# Patient Record
Sex: FEMALE | Race: WHITE
[De-identification: names, ages, dates, MRNs, and addresses within clinical notes are randomized per-mention and may not be internally consistent; named-entity substitution may affect disease eponyms.]

---

## 2017-10-09 ENCOUNTER — HOSPITAL ENCOUNTER (EMERGENCY)
Dept: HOSPITAL 47 - EC | Age: 37
Discharge: HOME | End: 2017-10-09
Payer: MEDICAID

## 2017-10-09 VITALS
TEMPERATURE: 98.1 F | SYSTOLIC BLOOD PRESSURE: 122 MMHG | DIASTOLIC BLOOD PRESSURE: 63 MMHG | HEART RATE: 64 BPM | RESPIRATION RATE: 18 BRPM

## 2017-10-09 DIAGNOSIS — F17.200: ICD-10-CM

## 2017-10-09 DIAGNOSIS — Z79.899: ICD-10-CM

## 2017-10-09 DIAGNOSIS — S82.831A: Primary | ICD-10-CM

## 2017-10-09 DIAGNOSIS — F32.9: ICD-10-CM

## 2017-10-09 DIAGNOSIS — F41.9: ICD-10-CM
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
## 2017-10-09 PROCEDURE — 99284 EMERGENCY DEPT VISIT MOD MDM: CPT

## 2017-10-09 PROCEDURE — 29515 APPLICATION SHORT LEG SPLINT: CPT

## 2017-10-09 NOTE — XR
EXAMINATION TYPE: XR ankle complete RT

 

DATE OF EXAM: 10/9/2017

 

COMPARISON: NONE

 

HISTORY: 37-year-old female fall downstairs, lateral sided pain

 

TECHNIQUE: 3 views

 

FINDINGS: 

There is a minimally displaced oblique fracture of the distal fibula at the level of the ankle joint 
line. There is borderline widening of the medial clear space at 4.1 mm. Talar dome appears intact. Po
sterior malleolus appears intact. Subtalar joint is aligned. Tiny plantar and posterior calcaneal spu
rs. Underlying ankle joint effusion noted.

 

 

IMPRESSION: 

1. Minimally displaced oblique fracture of the lateral malleolus.

2. Borderline widening of the medial clear space could reflect underlying deltoid ligament injury. Co
nsider a medial stress view to evaluate the integrity of the deltoid ligament.

## 2017-10-09 NOTE — ED
General Adult HPI





- General


Chief complaint: Fall


Stated complaint: fall/rt leg injury


Time Seen by Provider: 10/09/17 10:19


Source: patient, RN notes reviewed


Mode of arrival: wheelchair


Limitations: no limitations





- History of Present Illness


Initial comments: 





Patient is a 37-year-old female who presents emergency room today with chief 

complaint of an injury to the right ankle that occurred approximately 2 hours.  

Does not that she slipped going down some stairs.  Denies any other injury.  

Denies any head injury or loss consciousness.  Admits to pain locally to the 

right ankle with difficulty ambulating due to pain.  Patient denies any recent 

fever, chills, shortness of breath, chest pain, back pain, abdominal pain, 

nausea or vomiting, numbness or tingling, dysuria or hematuria, constipation or 

diarrhea, headaches or visual changes, or any other complaints.





- Related Data


 Home Medications











 Medication  Instructions  Recorded  Confirmed


 


ALPRAZolam [Xanax] 0.5 mg PO DAILY PRN 10/09/17 10/09/17


 


Cetirizine HCl [Zyrtec] 10 mg PO DAILY 10/09/17 10/09/17


 


Doxycycline Hyclate 100 mg PO BID 10/09/17 10/09/17


 


Escitalopram [Lexapro] 30 mg PO DAILY 10/09/17 10/09/17


 


Ibuprofen [Motrin] 800 mg PO ONCE PRN 10/09/17 10/09/17


 


buPROPion XL [Wellbutrin Xl] 150 mg PO DAILY 10/09/17 10/09/17








 Previous Rx's











 Medication  Instructions  Recorded


 


Acetaminophen-Codeine 300-30mg 1 each PO Q6H PRN #20 tablet 10/09/17





[Tylenol #3]  











 Allergies











Allergy/AdvReac Type Severity Reaction Status Date / Time


 


No Known Allergies Allergy   Verified 10/09/17 10:45














Review of Systems


ROS Statement: 


Those systems with pertinent positive or pertinent negative responses have been 

documented in the HPI.





ROS Other: All systems not noted in ROS Statement are negative.





Past Medical History


Past Medical History: No Reported History


History of Any Multi-Drug Resistant Organisms: None Reported


Past Surgical History: Appendectomy, Cholecystectomy


Past Psychological History: Anxiety, Depression


Smoking Status: Current some day smoker


Past Alcohol Use History: Rare


Past Drug Use History: None Reported





General Exam





- General Exam Comments


Initial Comments: 





General:  The patient is awake and alert, in no distress, and does not appear 

acutely ill.   


Neck:  The neck is supple, there is no tenderness or JVD.  


Cardiovascular:  There is a regular rate and rhythm. No murmur, rub or gallop 

is appreciated.


Respiratory:  Lungs are clear to auscultation, respirations are non-labored, 

breath sounds are equal.  No wheezes, stridor, rales, or rhonchi.


Musculoskeletal: Patient does have moderate swelling to the lateral aspect of 

the right ankle.  Tender over the lateral malleolus.  No tenderness over the 

medial.  Tender in ATFL.  Sensation intact.  Pulses equal bilaterally 2+.  No 

tenderness down to the right foot.  No tenderness to the fibular head or right 

knee.


Neurological:  A&O x 3. CN II-XII intact, There are no obvious motor or sensory 

deficits. Coordination appears grossly intact. Speech is normal.


Skin:  Skin is warm and dry and no rashes or lesions are noted. 


Psychiatric:  Normal mood and affect.  


Limitations: no limitations





Course


 Vital Signs











  10/09/17





  10:12


 


Temperature 97.8 F


 


Pulse Rate 89


 


Respiratory 17





Rate 


 


Blood Pressure 135/65


 


O2 Sat by Pulse 96





Oximetry 














Medical Decision Making





- Medical Decision Making





Patient x-ray reviewed and does show fracture the distal fibula.  Case was 

discussed with attending physician Dr. Villasenor.  CT of the ankle was also 

obtained.  Patient has been splinted in a posterior short leg OCL splint.  

Neurovascular rechecked and intact.  Patient will be given prescription for 

crutches and advised nonweightbearing on pain medication advised follow-up with 

pediatrician over the next 2 days.





Disposition


Clinical Impression: 


 Ankle fracture





Disposition: HOME SELF-CARE


Condition: Good


Instructions:  Ankle Fracture (ED)


Additional Instructions: 


Please use medication as discussed.  Continue to ice elevate and follow-up 

orthopedics over the next 2 days.  Please see splinted in place until follow-up 

appointment.  Please use crutches with nonweightbearing.  Please return to 

emergency room if the symptoms increase or worsen or for any other concerns.


Prescriptions: 


Acetaminophen-Codeine 300-30mg [Tylenol #3] 1 each PO Q6H PRN #20 tablet


 PRN Reason: Pain


Referrals: 


Derek White DO [Primary Care Provider] - 1-2 days


Bjorn Nobles MD [STAFF PHYSICIAN] - 1-2 days


Time of Disposition: 12:12

## 2017-10-09 NOTE — CT
CT right ankle

 

history: Fracture

 

Helical acquisition through the right ankle

 

Comparison to plain film same date

 

Oblique distal metaphyseal right fibular fracture is again noted, there is no significant displacemen
t. Minimal comminuted fragments are present distally at the medial extent. There is no evident disloc
ation. There is associated soft tissue swelling present. Ankle joint is intact. Achilles tendon is li
chaim intact. Flexor and extensor tendons felt to be intact.

 

IMPRESSION: Fracture as described and plain film. Additional findings above.

## 2018-05-07 ENCOUNTER — HOSPITAL ENCOUNTER (OUTPATIENT)
Dept: HOSPITAL 47 - LABWHC1 | Age: 38
Discharge: HOME | End: 2018-05-07
Payer: MEDICAID

## 2018-05-07 DIAGNOSIS — E55.9: Primary | ICD-10-CM

## 2018-05-07 PROCEDURE — 36415 COLL VENOUS BLD VENIPUNCTURE: CPT

## 2018-05-07 PROCEDURE — 82306 VITAMIN D 25 HYDROXY: CPT

## 2019-10-04 ENCOUNTER — HOSPITAL ENCOUNTER (OUTPATIENT)
Dept: HOSPITAL 47 - LABWHC1 | Age: 39
Discharge: HOME | End: 2019-10-04
Attending: PHYSICIAN ASSISTANT
Payer: MEDICAID

## 2019-10-04 DIAGNOSIS — L70.0: Primary | ICD-10-CM

## 2019-10-04 LAB
ALBUMIN SERPL-MCNC: 4.2 G/DL (ref 3.8–4.9)
ALBUMIN/GLOB SERPL: 2.21 G/DL (ref 1.6–3.17)
ALP SERPL-CCNC: 38 U/L (ref 41–126)
ALT SERPL-CCNC: 17 U/L (ref 8–44)
ANION GAP SERPL CALC-SCNC: 9.6 MMOL/L (ref 4–12)
AST SERPL-CCNC: 20 U/L (ref 13–35)
BUN SERPL-SCNC: 18 MG/DL (ref 9–27)
BUN/CREAT SERPL: 20 RATIO (ref 12–20)
CALCIUM SPEC-MCNC: 9.6 MG/DL (ref 8.7–10.3)
CHLORIDE SERPL-SCNC: 105 MMOL/L (ref 96–109)
CO2 SERPL-SCNC: 24.4 MMOL/L (ref 21.6–31.8)
GLOBULIN SER CALC-MCNC: 1.9 G/DL (ref 1.6–3.3)
GLUCOSE SERPL-MCNC: 113 MG/DL (ref 70–110)
POTASSIUM SERPL-SCNC: 4.1 MMOL/L (ref 3.5–5.5)
PROT SERPL-MCNC: 6.1 G/DL (ref 6.2–8.2)
SODIUM SERPL-SCNC: 139 MMOL/L (ref 135–145)

## 2019-10-04 PROCEDURE — 80053 COMPREHEN METABOLIC PANEL: CPT

## 2019-10-04 PROCEDURE — 36415 COLL VENOUS BLD VENIPUNCTURE: CPT

## 2021-04-16 ENCOUNTER — HOSPITAL ENCOUNTER (OUTPATIENT)
Dept: HOSPITAL 47 - LABWHC1 | Age: 41
Discharge: HOME | End: 2021-04-16
Attending: FAMILY MEDICINE
Payer: MEDICAID

## 2021-04-16 DIAGNOSIS — Z03.818: Primary | ICD-10-CM

## 2021-04-16 DIAGNOSIS — Z20.822: ICD-10-CM

## 2021-04-30 ENCOUNTER — HOSPITAL ENCOUNTER (OUTPATIENT)
Dept: HOSPITAL 47 - LABWHC1 | Age: 41
Discharge: HOME | End: 2021-04-30
Attending: FAMILY MEDICINE
Payer: MEDICAID

## 2021-04-30 DIAGNOSIS — R53.81: ICD-10-CM

## 2021-04-30 DIAGNOSIS — B34.1: Primary | ICD-10-CM

## 2021-04-30 PROCEDURE — 85025 COMPLETE CBC W/AUTO DIFF WBC: CPT

## 2021-04-30 PROCEDURE — 36415 COLL VENOUS BLD VENIPUNCTURE: CPT

## 2021-04-30 PROCEDURE — 80053 COMPREHEN METABOLIC PANEL: CPT

## 2021-04-30 PROCEDURE — 84439 ASSAY OF FREE THYROXINE: CPT

## 2021-04-30 PROCEDURE — 84443 ASSAY THYROID STIM HORMONE: CPT

## 2021-04-30 PROCEDURE — 82306 VITAMIN D 25 HYDROXY: CPT

## 2021-05-01 LAB
ALBUMIN SERPL-MCNC: 4.3 G/DL (ref 3.8–4.9)
ALBUMIN/GLOB SERPL: 1.72 G/DL (ref 1.6–3.17)
ALP SERPL-CCNC: 42 U/L (ref 41–126)
ALT SERPL-CCNC: 23 U/L (ref 8–44)
ANION GAP SERPL CALC-SCNC: 9.4 MMOL/L (ref 4–12)
AST SERPL-CCNC: 19 U/L (ref 13–35)
BASOPHILS # BLD AUTO: 0.05 X 10*3/UL (ref 0–0.1)
BASOPHILS NFR BLD AUTO: 0.5 %
BUN SERPL-SCNC: 14 MG/DL (ref 9–27)
BUN/CREAT SERPL: 15.56 RATIO (ref 12–20)
CALCIUM SPEC-MCNC: 9 MG/DL (ref 8.7–10.3)
CHLORIDE SERPL-SCNC: 107 MMOL/L (ref 96–109)
CO2 SERPL-SCNC: 24.6 MMOL/L (ref 21.6–31.8)
EOSINOPHIL # BLD AUTO: 0.25 X 10*3/UL (ref 0.04–0.35)
EOSINOPHIL NFR BLD AUTO: 2.7 %
ERYTHROCYTE [DISTWIDTH] IN BLOOD BY AUTOMATED COUNT: 4.75 X 10*6/UL (ref 4.1–5.2)
ERYTHROCYTE [DISTWIDTH] IN BLOOD: 11.9 % (ref 11.5–14.5)
GLOBULIN SER CALC-MCNC: 2.5 G/DL (ref 1.6–3.3)
GLUCOSE SERPL-MCNC: 110 MG/DL (ref 70–110)
HCT VFR BLD AUTO: 45.3 % (ref 37.2–46.3)
HGB BLD-MCNC: 15.1 G/DL (ref 12–15)
LYMPHOCYTES # SPEC AUTO: 2.55 X 10*3/UL (ref 0.9–5)
LYMPHOCYTES NFR SPEC AUTO: 27.2 %
MCH RBC QN AUTO: 31.8 PG (ref 27–32)
MCHC RBC AUTO-ENTMCNC: 33.3 G/DL (ref 32–37)
MCV RBC AUTO: 95.4 FL (ref 80–97)
MONOCYTES # BLD AUTO: 0.6 X 10*3/UL (ref 0.2–1)
MONOCYTES NFR BLD AUTO: 6.4 %
NEUTROPHILS # BLD AUTO: 5.91 X 10*3/UL (ref 1.8–7.7)
NEUTROPHILS NFR BLD AUTO: 63 %
PLATELET # BLD AUTO: 264 X 10*3/UL (ref 140–440)
POTASSIUM SERPL-SCNC: 4.1 MMOL/L (ref 3.5–5.5)
PROT SERPL-MCNC: 6.8 G/DL (ref 6.2–8.2)
SODIUM SERPL-SCNC: 141 MMOL/L (ref 135–145)
T4 FREE SERPL-MCNC: 1.1 NG/DL (ref 0.8–1.8)
WBC # BLD AUTO: 9.38 X 10*3/UL (ref 4.5–10)

## 2022-04-25 ENCOUNTER — HOSPITAL ENCOUNTER (OUTPATIENT)
Dept: HOSPITAL 47 - RADXRMAIN | Age: 42
Discharge: HOME | End: 2022-04-25
Attending: FAMILY MEDICINE
Payer: MEDICAID

## 2022-04-25 DIAGNOSIS — M79.642: Primary | ICD-10-CM

## 2022-04-25 NOTE — XR
EXAMINATION TYPE: XR hand complete LT

 

DATE OF EXAM: 4/25/2022

 

CLINICAL HISTORY: Left hand pain worse and third PIP joint.

 

TECHNIQUE:  Frontal, lateral and oblique images of the left hand are obtained.

 

COMPARISON: None.

 

FINDINGS:  There is no acute fracture/dislocation evident in the left hand. Mild to moderate narrowin
g throughout the PIP and DIP joints of the phalanges also at level of the fifth MCP joint.  No signif
icant spurring. The overlying soft tissue appears unremarkable.

 

IMPRESSION: As above.

## 2022-04-29 ENCOUNTER — HOSPITAL ENCOUNTER (OUTPATIENT)
Dept: HOSPITAL 47 - LABWHC1 | Age: 42
Discharge: HOME | End: 2022-04-29
Attending: FAMILY MEDICINE
Payer: MEDICAID

## 2022-04-29 DIAGNOSIS — Z00.00: Primary | ICD-10-CM

## 2022-04-29 LAB
ALBUMIN SERPL-MCNC: 4.2 G/DL (ref 3.8–4.9)
ALBUMIN/GLOB SERPL: 1.75 G/DL (ref 1.6–3.17)
ALP SERPL-CCNC: 50 U/L (ref 41–126)
ALT SERPL-CCNC: 14 U/L (ref 8–44)
ANION GAP SERPL CALC-SCNC: 13.9 MMOL/L (ref 10–18)
AST SERPL-CCNC: 10 U/L (ref 13–35)
BASOPHILS # BLD AUTO: 0.06 X 10*3/UL (ref 0–0.1)
BASOPHILS NFR BLD AUTO: 0.4 %
BUN SERPL-SCNC: 18.8 MG/DL (ref 9–27)
BUN/CREAT SERPL: 23.98 RATIO (ref 12–20)
CALCIUM SPEC-MCNC: 9.5 MG/DL (ref 8.7–10.3)
CHLORIDE SERPL-SCNC: 98 MMOL/L (ref 96–109)
CO2 SERPL-SCNC: 27.7 MMOL/L (ref 20–27.5)
EOSINOPHIL # BLD AUTO: 0.04 X 10*3/UL (ref 0.04–0.35)
EOSINOPHIL NFR BLD AUTO: 0.3 %
ERYTHROCYTE [DISTWIDTH] IN BLOOD BY AUTOMATED COUNT: 5.02 X 10*6/UL (ref 4.1–5.2)
ERYTHROCYTE [DISTWIDTH] IN BLOOD: 12.7 % (ref 11.5–14.5)
GLOBULIN SER CALC-MCNC: 2.4 G/DL (ref 1.6–3.3)
GLUCOSE SERPL-MCNC: 107 MG/DL (ref 70–110)
HCT VFR BLD AUTO: 48.7 % (ref 37.2–46.3)
HGB BLD-MCNC: 15.5 G/DL (ref 12–15)
IMM GRANULOCYTES BLD QL AUTO: 1 %
LYMPHOCYTES # SPEC AUTO: 2.03 X 10*3/UL (ref 0.9–5)
LYMPHOCYTES NFR SPEC AUTO: 13.3 %
MCH RBC QN AUTO: 30.9 PG (ref 27–32)
MCHC RBC AUTO-ENTMCNC: 31.8 G/DL (ref 32–37)
MCV RBC AUTO: 97 FL (ref 80–97)
MONOCYTES # BLD AUTO: 0.37 X 10*3/UL (ref 0.2–1)
MONOCYTES NFR BLD AUTO: 2.4 %
NEUTROPHILS # BLD AUTO: 12.64 X 10*3/UL (ref 1.8–7.7)
NEUTROPHILS NFR BLD AUTO: 82.6 %
NRBC BLD AUTO-RTO: 0 /100 WBCS (ref 0–0)
PLATELET # BLD AUTO: 297 X 10*3/UL (ref 140–440)
POTASSIUM SERPL-SCNC: 4.1 MMOL/L (ref 3.5–5.5)
PROT SERPL-MCNC: 6.5 G/DL (ref 6.2–8.2)
RHEUMATOID FACT SERPL-ACNC: <10 IU/ML (ref 0–15)
SODIUM SERPL-SCNC: 140 MMOL/L (ref 135–145)
T4 FREE SERPL-MCNC: 1.13 NG/DL (ref 0.8–1.8)
URATE SERPL-MCNC: 4.6 MG/DL (ref 2.9–7.7)
WBC # BLD AUTO: 15.29 X 10*3/UL (ref 4.5–10)

## 2022-04-29 PROCEDURE — 84439 ASSAY OF FREE THYROXINE: CPT

## 2022-04-29 PROCEDURE — 86038 ANTINUCLEAR ANTIBODIES: CPT

## 2022-04-29 PROCEDURE — 86140 C-REACTIVE PROTEIN: CPT

## 2022-04-29 PROCEDURE — 86200 CCP ANTIBODY: CPT

## 2022-04-29 PROCEDURE — 84443 ASSAY THYROID STIM HORMONE: CPT

## 2022-04-29 PROCEDURE — 84550 ASSAY OF BLOOD/URIC ACID: CPT

## 2022-04-29 PROCEDURE — 85652 RBC SED RATE AUTOMATED: CPT

## 2022-04-29 PROCEDURE — 86431 RHEUMATOID FACTOR QUANT: CPT

## 2022-04-29 PROCEDURE — 85025 COMPLETE CBC W/AUTO DIFF WBC: CPT

## 2022-04-29 PROCEDURE — 36415 COLL VENOUS BLD VENIPUNCTURE: CPT

## 2022-04-29 PROCEDURE — 80053 COMPREHEN METABOLIC PANEL: CPT

## 2022-04-30 LAB — CCP IGG SERPL-ACNC: <0.5 U/ML

## 2024-12-09 ENCOUNTER — HOSPITAL ENCOUNTER (EMERGENCY)
Dept: HOSPITAL 47 - EC | Age: 44
Discharge: HOME | End: 2024-12-09
Payer: MEDICAID

## 2024-12-09 VITALS
RESPIRATION RATE: 18 BRPM | SYSTOLIC BLOOD PRESSURE: 127 MMHG | TEMPERATURE: 98.8 F | DIASTOLIC BLOOD PRESSURE: 75 MMHG | HEART RATE: 72 BPM

## 2024-12-09 DIAGNOSIS — F17.200: ICD-10-CM

## 2024-12-09 DIAGNOSIS — M54.50: Primary | ICD-10-CM

## 2024-12-09 PROCEDURE — 99283 EMERGENCY DEPT VISIT LOW MDM: CPT

## 2024-12-09 PROCEDURE — 72110 X-RAY EXAM L-2 SPINE 4/>VWS: CPT

## 2024-12-09 PROCEDURE — 96372 THER/PROPH/DIAG INJ SC/IM: CPT

## 2024-12-09 RX ADMIN — KETOROLAC TROMETHAMINE STA MG: 15 INJECTION, SOLUTION INTRAMUSCULAR; INTRAVENOUS at 08:29

## 2024-12-09 NOTE — ED
General Adult HPI





- General


Chief complaint: Back Pain/Injury


Stated complaint: back pain


Time Seen by Provider: 12/09/24 07:54


Source: patient, RN notes reviewed, old records reviewed


Mode of arrival: ambulatory


Limitations: no limitations





- History of Present Illness


Initial comments: 





44-year-old female presenting with mid low back pain.  No specific injury.  

Patient states this has worsened over the past 24 hours.  She has had issue with

low back pain in the past.  Patient denies any hematuria or dysuria.  Denies 

flank pain.  Denies abdominal pain.  Denies any loss of bowel or bladder 

function.  No fever.





- Related Data


                                Home Medications











 Medication  Instructions  Recorded  Confirmed


 


ALPRAZolam [Xanax] 0.5 mg PO DAILY PRN 10/09/17 10/09/17


 


Cetirizine HCl [Zyrtec] 10 mg PO DAILY 10/09/17 10/09/17


 


Doxycycline Hyclate 100 mg PO BID 10/09/17 10/09/17


 


Escitalopram [Lexapro] 30 mg PO DAILY 10/09/17 10/09/17


 


Ibuprofen [Motrin] 800 mg PO ONCE PRN 10/09/17 10/09/17


 


buPROPion XL [Wellbutrin Xl] 150 mg PO DAILY 10/09/17 10/09/17








                                  Previous Rx's











 Medication  Instructions  Recorded


 


Acetaminophen-Codeine 300-30mg 1 each PO Q6H PRN #20 tablet 10/09/17





[Tylenol #3]  


 


Cyclobenzaprine [Flexeril] 10 mg PO TID PRN #9 tab 12/09/24


 


Ibuprofen [Motrin] 600 mg PO Q8HR PRN #24 tab 12/09/24











                                    Allergies











Allergy/AdvReac Type Severity Reaction Status Date / Time


 


No Known Allergies Allergy   Verified 12/09/24 07:54














Review of Systems


ROS Statement: 


Those systems with pertinent positive or pertinent negative responses have been 

documented in the HPI.





ROS Other: All systems not noted in ROS Statement are negative.





Past Medical History


Past Medical History: No Reported History


History of Any Multi-Drug Resistant Organisms: None Reported


Past Surgical History: Appendectomy, Cholecystectomy


Past Psychological History: Anxiety, Depression


Smoking Status: Current every day smoker


Past Alcohol Use History: Rare


Past Drug Use History: None Reported





General Exam


Limitations: no limitations


General appearance: alert, in no apparent distress


Head exam: Present: atraumatic, normocephalic


Eye exam: Present: normal appearance, PERRL


Neck exam: Present: normal inspection.  Absent: tenderness


Cardiovascular Exam: Present: regular rate, normal rhythm


GI/Abdominal exam: Present: soft.  Absent: distended, tenderness, guarding, 

rebound


Extremities exam: Present: normal inspection, normal capillary refill


Back exam: Present: vertebral tenderness (Lumbar)


Neurological exam: Present: alert, oriented X3


Psychiatric exam: Present: normal affect, normal mood


Skin exam: Present: warm, dry, intact





Course


                                   Vital Signs











  12/09/24





  07:51


 


Temperature 97.6 F


 


Pulse Rate 85


 


Respiratory 24





Rate 


 


Blood Pressure 113/78


 


O2 Sat by Pulse 98





Oximetry 














Medical Decision Making





- Medical Decision Making





Was pt. sent in by a medical professional or institution (, PA, NP, urgent 

care, hospital, or nursing home...) When possible be specific


@  -No


Did you speak to anyone other than the patient for history (EMS, parent, family,

 police, friend...)? What history was obtained from this source 


@  -No


Did you review nursing and triage notes (agree or disagree)?  Why? 


@  -I reviewed and agree with nursing and triage notes


Were old charts reviewed (outside hosp., previous admission, EMS record, old 

EKG, old radiological studies, urgent care reports/EKG's, nursing home records)?

 Report findings 


@  -No old charts were reviewed


Differential Back Pain:


Strain, zoster, cauda equina syndrome, epidural abscess, vertebral 

osteomyelitis, discitis, fracture, subluxation, disc herniation, DJD, spinal 

stenosis, dissection, AAA, pancreatitis, peptic ulcer disease, pyelonephritis, 

kidney stone, this is not meant to be an all-inclusive list.





EKG interpreted by me (3pts min.).


@  -As above


X-rays interpreted by me (1pt min.).


@  -X-rays of the lumbar spine show loss of lordosis without acute bony 

abnormality.


CT interpreted by me (1pt min.).


@  -None done


U/S interpreted by me (1pt. min.).


@  -None done


What testing was considered but not performed or refused? (CT, X-rays, U/S, 

labs)? Why?


@  -None


What meds were considered but not given or refused? Why?


@  -None


Did you discuss the management of the patient with other professionals 

(professionals i.e. , PA, NP, lab, RT, psych nurse, , , 

teacher, , )? Give summary


@  -No


Was smoking cessation discussed for >3mins.?


@  -No


Was critical care preformed (if so, how long)?


@  -No


Were there social determinants of health that impacted care today? How? 

(Homelessness, low income, unemployed, alcoholism, drug addiction, 

transportation, low edu. Level, literacy, decrease access to med. care, alf, 

rehab)?


@  -No


Was there de-escalation of care discussed even if they declined (Discuss DNR or 

withdrawal of care, Hospice)? DNR status


@  -No


What co-morbidities impacted this encounter? (DM, HTN, Smoking, COPD, CAD, 

Cancer, CVA, ARF, Chemo, Hep., AIDS, mental health diagnosis, sleep apnea, 

morbid obesity)?


@  -None


Was patient admitted / discharged? Hospital course, mention meds given and 

route, prescriptions, significant lab abnormalities, going to OR and other 

pertinent info.


@44-year-old female with low back pain patient's pain was midline I did perform 

x-rays which were negative for acute bony abnormality.  Patient feels better 

after Valium and Toradol.  Will be prescribed Motrin and Flexeril and will 

follow closely with the primary care provider.  She has no red flag symptoms, no

 radicular symptoms.


Undiagnosed new problem with uncertain prognosis?


@  -No


Drug Therapy requiring intensive monitoring for toxicity (Heparin, Nitro, 

Insulin, Cardizem)?


@  -No


Were any procedures done?


@  -No


Diagnosis/symptom?


@Acute low back pain


Acute, or Chronic, or Acute on Chronic?


@  -[Acute


Uncomplicated (without systemic symptoms) or Complicated (systemic symptoms)?


@  -Default


Side effects of treatment?


@  -No


Exacerbation, Progression, or Severe Exacerbation?


@  -No


Poses a threat to life or bodily function? How? (Chest pain, USA, MI, pneumonia,

 PE, COPD, DKA, ARF, appy, cholecystitis, CVA, Diverticulitis, Homicidal, 

Suicidal, threat to staff... and all critical care pts)


@  -No








Disposition


Clinical Impression: 


 Mechanical back pain





Disposition: HOME SELF-CARE


Condition: Fair


Instructions (If sedation given, give patient instructions):  Acute Low Back 

Pain (ED)


Prescriptions: 


Cyclobenzaprine [Flexeril] 10 mg PO TID PRN #9 tab


 PRN Reason: Muscle Spasm


Ibuprofen [Motrin] 600 mg PO Q8HR PRN #24 tab


 PRN Reason: Pain


Is patient prescribed a controlled substance at d/c from ED?: No


Referrals: 


Fran Keita DO [Primary Care Provider] - 1-2 days


Time of Disposition: 09:31

## 2024-12-09 NOTE — XR
EXAMINATION TYPE: XR lumbosacral spine min 4V

 

DATE OF EXAM: 12/9/2024 9:04 AM

 

COMPARISON: None. 

 

CLINICAL INDICATION: Female, 44 years old with history of pain,

 

TECHNIQUE: 5 view(s) obtained.

 

FINDINGS: 

There are 5 lumbar-type vertebral bodies. Pedicles are intact. Mild disc space narrowing is present L
4-5 and to a lesser degree L3-4 and L2-3. Vertebral body heights are preserved. Minimal kyphosis is p
resent within the thoracolumbar spine. No spondylolisthesis is evident. Facets are normal. No spondyl
olytic defects are evident.

 

IMPRESSION:

1.  No acute osseous abnormality radiographically apparent.

2. Mild degenerative disc changes.

3. Thoracolumbar kyphosis could be related to patient positioning or muscle spasm.

 

[facility], Workstation: [id number], 12/9/2024 9:21 AM